# Patient Record
(demographics unavailable — no encounter records)

---

## 2025-02-18 NOTE — LETTER BODY
[FreeTextEntry1] : Arturo Escobar MD  [address] [address] [place] [address]  [address] [place] [address]  P [phone]  F [phone]    Dear Dr. Escobar,    Reason for visit: Bilateral renal calculi. Previous gross hematuria.    This is a 72 year-old Mandarin speaking woman with previous right ureteral calculi status post bilateral ureteroscopy in June 2019, and previous gross hematuria, presenting with bilateral renal calculi. Her renal ultrasound in March 2021 demonstrated a stable simple left renal cyst and bilateral renal calculi, measuring 3.2 mm on the left and 2.8 mm on the right. Her renal ultrasound in March 2022 demonstrated stable bilateral renal calculi measuring up to 4 mm and a stable simple left renal cyst.  Her renal ultrasound in February 2024 demonstrated stable small non obstructive bilateral kidney stones. She returns today for follow-up and renal ultrasound. Ultrasound today demonstrated a small 3 mm right renal stone fragment. Since she was last seen, the patient denies any interval complaints or difficulties. She denies passing any stones. She denies any flank pain. The patient is overall well. She denies any changes in health. Patient denies any hematuria or fevers or chills. The past medical history, family history and social history are unchanged. All other review of systems are negative. Patient denies any changes in medications. Medication list was reconciled.    On examination, the patient is a healthy-appearing woman in no acute distress. She is alert and oriented follows commands. She has normal mood and affect. She is normocephalic. Neck is supple. Respirations are unlabored. Abdomen is soft and nontender. Liver is not palpable. Bladder is nonpalpable. No CVA tenderness. Neurologically she is grossly intact. No peripheral edema. Skin without gross abnormality.    I personally reviewed ultrasound images with the patient today and images again demonstrated a 3 mm small right renal stone fragment with no solid masses or hydronephrosis visualized.     Assessment: Bilateral renal calculi. Previous gross hematuria.    I counseled the patient. In terms of her kidney stones, her renal ultrasound today demonstrated a small right kidney stone fragment. I reassured the patient as she denied any symptoms. I encouraged the patient to maintain a low-protein low-sodium diet. I also encouraged hydration to prevent stone formation and to help facilitate stone passage. I recommend the patient follow-up in 1 year for repeat ultrasound. Risks and alternatives were discussed. I answered the patient questions. The patient will follow-up as directed and will contact me with any questions or concerns. Thank you for the opportunity to participate in the care of Ms. SCHWARTZ. I will keep you updated on her progress.  Plan: Stone diet. Hydration. Follow-up in 1 year for repeat renal ultrasound.  I spent 30-minutes time today on all issues related to this patient on today date of service including non face to face time.

## 2025-02-18 NOTE — ADDENDUM
[FreeTextEntry1] : Entered by Davian Motley, acting as scribe for Dr. Madhu Newberry. The documentation recorded by the scribe accurately reflects the service I personally performed and the decisions made by me.